# Patient Record
Sex: MALE | Race: WHITE | NOT HISPANIC OR LATINO | Employment: OTHER | URBAN - METROPOLITAN AREA
[De-identification: names, ages, dates, MRNs, and addresses within clinical notes are randomized per-mention and may not be internally consistent; named-entity substitution may affect disease eponyms.]

---

## 2017-07-12 ENCOUNTER — ESTABLISHED PATIENT (OUTPATIENT)
Dept: URBAN - METROPOLITAN AREA CLINIC 39 | Facility: CLINIC | Age: 82
End: 2017-07-12

## 2017-07-12 DIAGNOSIS — Z96.1: ICD-10-CM

## 2017-07-12 DIAGNOSIS — H35.373: ICD-10-CM

## 2017-07-12 DIAGNOSIS — H40.013: ICD-10-CM

## 2017-07-12 DIAGNOSIS — H18.51: ICD-10-CM

## 2017-07-12 PROCEDURE — 92014 COMPRE OPH EXAM EST PT 1/>: CPT

## 2017-07-12 PROCEDURE — 92015 DETERMINE REFRACTIVE STATE: CPT

## 2017-07-12 PROCEDURE — G8756 NO BP MEASURE DOC: HCPCS

## 2017-07-12 PROCEDURE — 1036F TOBACCO NON-USER: CPT

## 2017-07-12 ASSESSMENT — VISUAL ACUITY
OD_SC: J6
OS_SC: 20/50
OS_CC: 20/30-2
OS_SC: J6
OS_BAT: 20/30
OD_CC: 20/70-2

## 2017-07-12 ASSESSMENT — TONOMETRY
OS_IOP_MMHG: 20
OD_IOP_MMHG: 20

## 2018-07-19 ENCOUNTER — ESTABLISHED COMPREHENSIVE EXAM (OUTPATIENT)
Dept: URBAN - METROPOLITAN AREA CLINIC 39 | Facility: CLINIC | Age: 83
End: 2018-07-19

## 2018-07-19 DIAGNOSIS — H01.026: ICD-10-CM

## 2018-07-19 DIAGNOSIS — H18.51: ICD-10-CM

## 2018-07-19 DIAGNOSIS — H01.023: ICD-10-CM

## 2018-07-19 DIAGNOSIS — H26.492: ICD-10-CM

## 2018-07-19 DIAGNOSIS — Z96.1: ICD-10-CM

## 2018-07-19 DIAGNOSIS — H35.373: ICD-10-CM

## 2018-07-19 DIAGNOSIS — H40.013: ICD-10-CM

## 2018-07-19 PROCEDURE — 1036F TOBACCO NON-USER: CPT

## 2018-07-19 PROCEDURE — G8427 DOCREV CUR MEDS BY ELIG CLIN: HCPCS

## 2018-07-19 PROCEDURE — 92014 COMPRE OPH EXAM EST PT 1/>: CPT

## 2018-07-19 PROCEDURE — 92015 DETERMINE REFRACTIVE STATE: CPT

## 2018-07-19 PROCEDURE — G8756 NO BP MEASURE DOC: HCPCS

## 2018-07-19 ASSESSMENT — VISUAL ACUITY
OD_SC: 20/200
OS_SC: J5
OD_CC: 20/80
OS_SC: 20/40-1
OD_SC: J5
OS_CC: 20/40-1

## 2018-07-19 ASSESSMENT — TONOMETRY
OS_IOP_MMHG: 18
OD_IOP_MMHG: 18

## 2019-04-03 NOTE — PROCEDURE NOTE: CLINICAL
PROCEDURE NOTE: Lucentis 0.5 mg OS. Diagnosis: Branch Retinal Vein Occlusion with Macular Edema. Anesthesia: Topical. Prep: Betadine Drops and Scrubs. Prior to injection, risks/benefits/alternatives discussed including infection, loss of vision, hemorrhage, cataract, glaucoma, retinal tears or detachment and patient wished to proceed. Informed consent obtained. . Patient was advised the purpose of the treatment was to slow the progression of the disease, and may not improve visual acuity. Betadine prep was performed. Injection site: 3-4 mm from the limbus. Mask worn during procedure. A lid speculum was used. Intravitreal injection of Lucentis 0.5mg/0.05 ml was given. Discarded remaining *. CRA perfusion confirmed. The eye was irrigated with sterile eye rinse solution. The betadine was washed away. Count fingers vision was verified. The patient tolerated the procedure well and there were no complications from the procedure. Post procedure instructions given. Patient given office phone number/answering service number and advised to call immediately should there be an increase in floaters or redness, loss of vision or pain, or should they have any other questions or concerns. Patient was given the standard instruction sheet. Alexandra Nation

## 2019-04-03 NOTE — PATIENT DISCUSSION
Based on today's exam, diagnostic studies, and review of records, the determination was made for treatment today. Edema present.  Lucentis 0.5mg recommended today, will do treatment today and return in 1 month. Tight BP control discussed.   After discussion of benefits, risks and alternatives. The injection was given without complication and tolerated well by the patient. Post-injection instructions were reviewed and understood by the patient. Procedure was performed without complications. Instructed to call immediately if any new distortion, blurring, decreased vision or eye pain.

## 2019-05-06 NOTE — PROCEDURE NOTE: CLINICAL
PROCEDURE NOTE: Lucentis 0.5 mg OS. Diagnosis: Branch Retinal Vein Occlusion with Macular Edema. Prior to injection, risks/benefits/alternatives discussed including infection, loss of vision, hemorrhage, cataract, glaucoma, retinal tears or detachment and patient wished to proceed. Informed consent obtained. . Patient was advised the purpose of the treatment was to slow the progression of the disease, and may not improve visual acuity. Betadine prep was performed. Injection site: 3-4 mm from the limbus. Mask worn during procedure. A lid speculum was used. Intravitreal injection of Lucentis 0.5mg/0.05 ml was given. Discarded remaining *. CRA perfusion confirmed. The eye was irrigated with sterile eye rinse solution. The betadine was washed away. Count fingers vision was verified. The patient tolerated the procedure well and there were no complications from the procedure. Post procedure instructions given. Patient given office phone number/answering service number and advised to call immediately should there be an increase in floaters or redness, loss of vision or pain, or should they have any other questions or concerns. Patient was given the standard instruction sheet. Elayne Lizama

## 2019-05-06 NOTE — PATIENT DISCUSSION
Has had 1 Lucentis injection improved, will get 2nd injection today, will need to follow up in [place] 4/5 weeks for injection. Based on today's exam, diagnostic studies, and review of records, the determination was made for treatment today. Edema present.  Lucentis 0.5mg recommended today, will do treatment today and return in 4/5 weeks. Tight BP control discussed.   After discussion of benefits, risks and alternatives. The injection was given without complication and tolerated well by the patient. Post-injection instructions were reviewed and understood by the patient. Procedure was performed without complications. Instructed to call immediately if any new distortion, blurring, decreased vision or eye pain.

## 2019-08-21 ENCOUNTER — ESTABLISHED COMPREHENSIVE EXAM (OUTPATIENT)
Dept: URBAN - METROPOLITAN AREA CLINIC 39 | Facility: CLINIC | Age: 84
End: 2019-08-21

## 2019-08-21 DIAGNOSIS — H40.013: ICD-10-CM

## 2019-08-21 DIAGNOSIS — H18.51: ICD-10-CM

## 2019-08-21 DIAGNOSIS — H26.492: ICD-10-CM

## 2019-08-21 DIAGNOSIS — H35.373: ICD-10-CM

## 2019-08-21 DIAGNOSIS — Z96.1: ICD-10-CM

## 2019-08-21 PROCEDURE — 92015 DETERMINE REFRACTIVE STATE: CPT

## 2019-08-21 PROCEDURE — 92134 CPTRZ OPH DX IMG PST SGM RTA: CPT

## 2019-08-21 PROCEDURE — 92014 COMPRE OPH EXAM EST PT 1/>: CPT

## 2019-08-21 ASSESSMENT — TONOMETRY
OD_IOP_MMHG: 18
OS_IOP_MMHG: 17

## 2019-08-21 ASSESSMENT — VISUAL ACUITY
OD_SC: J3
OS_SC: 20/40-2
OU_SC: J3
OD_SC: 20/400
OS_SC: J5
OU_SC: 20/30-2

## 2019-10-28 NOTE — PATIENT DISCUSSION
Stable, Tight BP control discussed. Discussed the importance of blood pressure control in the prevention of ocular complications.

## 2019-10-28 NOTE — PROCEDURE NOTE: CLINICAL
PROCEDURE NOTE: Focal Laser, Retina OS. Diagnosis: Branch Retinal Vein Occlusion with Macular Edema. Anesthesia: Topical. Prior to focal laser, risks/benefits/alternatives to laser discussed including loss of vision and patient wished to proceed. Spot size: 100um. Power: 400 mW. Number of pulses: 5. Patient tolerated procedure well. There were no complications. Post procedure instructions given. Alexandra Nation

## 2019-10-28 NOTE — PATIENT DISCUSSION
will add focal today, Pt has been receiving DANA every 5-6 weeks while up [facility]. Last TX was 10/8/19. Cee Ruiz bring pt back in 2 weeks for tx only.

## 2019-11-08 NOTE — PATIENT DISCUSSION
Lucentis 0.5mg  recommended today after discussion of benefits, risks and alternatives. The injection was given and tolerated well by patient. Post-injection instructions were reviewed and understood by the patient.

## 2019-11-08 NOTE — PATIENT DISCUSSION
Based on today's exam, diagnostic studies, and/or review of records, the determination was made for treatment today.

## 2019-11-08 NOTE — PROCEDURE NOTE: CLINICAL
PROCEDURE NOTE: Lucentis 0.5 mg OS. Diagnosis: Branch Retinal Vein Occlusion with Macular Edema. Anesthesia: Topical. Prep: Betadine Drops and Scrubs. Prior to injection, risks/benefits/alternatives discussed including infection, loss of vision, hemorrhage, cataract, glaucoma, retinal tears or detachment and patient wished to proceed. Informed consent obtained. . Patient was advised the purpose of the treatment was to slow the progression of the disease, and may not improve visual acuity. Betadine prep was performed. Injection site: 3-4 mm from the limbus. Mask worn during procedure. A lid speculum was used. Intravitreal injection of Lucentis 0.5mg/0.05 ml was given. Discarded remaining *. CRA perfusion confirmed. The eye was irrigated with sterile eye rinse solution. The betadine was washed away. Count fingers vision was verified. The patient tolerated the procedure well and there were no complications from the procedure. Post procedure instructions given. Patient given office phone number/answering service number and advised to call immediately should there be an increase in floaters or redness, loss of vision or pain, or should they have any other questions or concerns. Patient was given the standard instruction sheet. Dhruv Orta

## 2019-12-17 NOTE — PROCEDURE NOTE: CLINICAL
PROCEDURE NOTE: Eylea 2mg OS. Diagnosis: Branch Retinal Vein Occlusion with Macular Edema. Prior to injection, risks/benefits/alternatives discussed including corneal abrasion, infection, loss of vision, hemorrhage, cataract, glaucoma, retinal tears or detachment. A written consent is on file, and the need for today’s injection was discussed and the patient is understanding and wishes to proceed. The entire vial of Eylea was drawn up into a syringe. The opened vial, remaining drug, and filtered needle were disposed of in a certified biohazard container. Betadine prep was performed. Topical anesthesia was induced with Alcaine. 4% lidocaine pledge. A lid speculum was used. A short 30g needle on a 1cc syringe was used with product that that had previously been prepared under sterile conditions. Injection site: 3-4 mm from the limbus. The used syringe/needle was transferred to a biohazard container. Lid speculum removed. Mask worn during procedure. Patient tolerated procedure well. Count fingers vision was verified. There were no complications. Patient was given the standard instruction sheet. Patient given office phone number/answering service number and advised to call immediately should there be loss of vision or pain, or should they have any other questions or concerns. Leslye Guevara

## 2019-12-17 NOTE — PATIENT DISCUSSION
Based on today's exam, diagnostic studies, and/or review of records, the determination was made for treatment today.  Increased edema, worse, will switch to [facility] today.

## 2020-01-22 NOTE — PATIENT DISCUSSION
Based on today's exam, diagnostic studies, and/or review of records, the determination was made for treatment today.  Decreased edema, improved.

## 2020-01-22 NOTE — PROCEDURE NOTE: CLINICAL
PROCEDURE NOTE: Eylea 2mg OS. Diagnosis: Branch Retinal Vein Occlusion with Macular Edema. Prior to injection, risks/benefits/alternatives discussed including corneal abrasion, infection, loss of vision, hemorrhage, cataract, glaucoma, retinal tears or detachment. A written consent is on file, and the need for today’s injection was discussed and the patient is understanding and wishes to proceed. The entire vial of Eylea was drawn up into a syringe. The opened vial, remaining drug, and filtered needle were disposed of in a certified biohazard container. Betadine prep was performed. Topical anesthesia was induced with Alcaine. 4% lidocaine pledge. A lid speculum was used. A short 30g needle on a 1cc syringe was used with product that that had previously been prepared under sterile conditions. Injection site: 3-4 mm from the limbus. The used syringe/needle was transferred to a biohazard container. Lid speculum removed. Mask worn during procedure. Patient tolerated procedure well. Count fingers vision was verified. There were no complications. Patient was given the standard instruction sheet. Patient given office phone number/answering service number and advised to call immediately should there be loss of vision or pain, or should they have any other questions or concerns. Gloria Gentile

## 2020-01-22 NOTE — PATIENT DISCUSSION
Slightly Worse, Tight BP control discussed. Discussed the importance of blood pressure control in the prevention of ocular complications.

## 2020-01-22 NOTE — PATIENT DISCUSSION
Colton recommended today after discussion of benefits, risks and alternatives. The injection was given and tolerated well by patient. Post-injection instructions were reviewed and understood by the patient.

## 2020-05-05 NOTE — PATIENT DISCUSSION
Based on today's exam, diagnostic studies, and/or review of records, the determination was made for treatment today. Increased Fluid, Edema, will treat today and will treat PRN .

## 2020-05-05 NOTE — PROCEDURE NOTE: CLINICAL
PROCEDURE NOTE: Eylea Prefilled Syringe 2mg OS. Diagnosis: Branch Retinal Vein Occlusion with Macular Edema. Prior to injection, risks/benefits/alternatives discussed including corneal abrasion, infection, loss of vision, hemorrhage, cataract, glaucoma, retinal tears or detachment. A written consent is on file, and the need for today's injection was discussed and the patient is understanding and wishes to proceed. A 30G needle was placed on an Eylea 2mg/0.05ml Pre-filled Syringe. Betadine prep was performed. Topical anesthesia was induced with Alcaine. 4% lidocaine pledge. A lid speculum was used. An intravitreal injection of Eylea was given. Injection site: 3-4 mm from the limbus. The used syringe/needle was transferred to a biohazard container. Lid speculum removed. Mask worn during procedure. Patient tolerated procedure well. Count fingers vision was verified. There were no complications. Patient was given the standard instruction sheet. Dhruv Orta

## 2020-06-26 ENCOUNTER — ESTABLISHED COMPREHENSIVE EXAM (OUTPATIENT)
Dept: URBAN - METROPOLITAN AREA CLINIC 39 | Facility: CLINIC | Age: 85
End: 2020-06-26

## 2020-06-26 DIAGNOSIS — H26.492: ICD-10-CM

## 2020-06-26 DIAGNOSIS — H52.223: ICD-10-CM

## 2020-06-26 DIAGNOSIS — Z96.1: ICD-10-CM

## 2020-06-26 DIAGNOSIS — H35.373: ICD-10-CM

## 2020-06-26 DIAGNOSIS — H52.4: ICD-10-CM

## 2020-06-26 DIAGNOSIS — H35.3131: ICD-10-CM

## 2020-06-26 DIAGNOSIS — H40.013: ICD-10-CM

## 2020-06-26 DIAGNOSIS — H52.13: ICD-10-CM

## 2020-06-26 DIAGNOSIS — H18.51: ICD-10-CM

## 2020-06-26 DIAGNOSIS — H01.026: ICD-10-CM

## 2020-06-26 DIAGNOSIS — H01.023: ICD-10-CM

## 2020-06-26 PROCEDURE — 92134 CPTRZ OPH DX IMG PST SGM RTA: CPT

## 2020-06-26 PROCEDURE — 92015 DETERMINE REFRACTIVE STATE: CPT

## 2020-06-26 PROCEDURE — 92499OP2 OPTOMAP RETINAL SCREENING BOTH EYES

## 2020-06-26 PROCEDURE — 92014 COMPRE OPH EXAM EST PT 1/>: CPT

## 2020-06-26 ASSESSMENT — VISUAL ACUITY
OS_SC: J5
OD_SC: 20/200
OS_SC: 20/40
OD_SC: J3
OU_SC: 20/40+1
OS_BAT: 20/400 W/MR
OU_SC: J3

## 2020-06-26 ASSESSMENT — KERATOMETRY
OD_K1POWER_DIOPTERS: 42.75
OS_K2POWER_DIOPTERS: 43
OD_AXISANGLE_DEGREES: 176
OD_AXISANGLE2_DEGREES: 86
OD_K2POWER_DIOPTERS: 43.75
OS_AXISANGLE2_DEGREES: 90
OS_AXISANGLE_DEGREES: 0
OS_K1POWER_DIOPTERS: 43

## 2020-06-26 ASSESSMENT — TONOMETRY
OS_IOP_MMHG: 18
OD_IOP_MMHG: 18

## 2022-07-12 ENCOUNTER — COMPREHENSIVE EXAM (OUTPATIENT)
Dept: URBAN - METROPOLITAN AREA CLINIC 39 | Facility: CLINIC | Age: 87
End: 2022-07-12

## 2022-07-12 DIAGNOSIS — H52.223: ICD-10-CM

## 2022-07-12 DIAGNOSIS — H26.492: ICD-10-CM

## 2022-07-12 DIAGNOSIS — H01.023: ICD-10-CM

## 2022-07-12 DIAGNOSIS — H35.3131: ICD-10-CM

## 2022-07-12 DIAGNOSIS — H52.13: ICD-10-CM

## 2022-07-12 DIAGNOSIS — H01.026: ICD-10-CM

## 2022-07-12 DIAGNOSIS — H35.373: ICD-10-CM

## 2022-07-12 DIAGNOSIS — H52.4: ICD-10-CM

## 2022-07-12 DIAGNOSIS — E11.9: ICD-10-CM

## 2022-07-12 DIAGNOSIS — H40.013: ICD-10-CM

## 2022-07-12 DIAGNOSIS — H18.513: ICD-10-CM

## 2022-07-12 PROCEDURE — 92134 CPTRZ OPH DX IMG PST SGM RTA: CPT

## 2022-07-12 PROCEDURE — 92015 DETERMINE REFRACTIVE STATE: CPT

## 2022-07-12 PROCEDURE — 92014 COMPRE OPH EXAM EST PT 1/>: CPT

## 2022-07-12 PROCEDURE — 92499OP2 OPTOMAP RETINAL SCREENING BOTH EYES

## 2022-07-12 ASSESSMENT — TONOMETRY
OS_IOP_MMHG: 18
OD_IOP_MMHG: 18

## 2022-07-12 ASSESSMENT — VISUAL ACUITY
OU_SC: J3
OS_SC: 20/40-1
OU_SC: 20/40-1
OS_SC: J6
OD_CC: 20/80
OD_SC: J3
OD_SC: 20/400
OU_CC: 20/25
OS_CC: 20/25

## 2022-07-12 ASSESSMENT — KERATOMETRY
OS_AXISANGLE2_DEGREES: 90
OD_AXISANGLE2_DEGREES: 86
OS_K2POWER_DIOPTERS: 43
OS_K1POWER_DIOPTERS: 43
OD_K2POWER_DIOPTERS: 43.75
OD_K1POWER_DIOPTERS: 42.75
OS_AXISANGLE_DEGREES: 0
OD_AXISANGLE_DEGREES: 176

## 2023-06-13 NOTE — PATIENT DISCUSSION
Based on today's exam, diagnostic studies, and/or review of records, the determination was made for treatment today. English